# Patient Record
Sex: MALE | URBAN - METROPOLITAN AREA
[De-identification: names, ages, dates, MRNs, and addresses within clinical notes are randomized per-mention and may not be internally consistent; named-entity substitution may affect disease eponyms.]

---

## 2019-10-25 ENCOUNTER — TELEPHONE (OUTPATIENT)
Dept: GASTROENTEROLOGY | Facility: CLINIC | Age: 79
End: 2019-10-25

## 2019-10-25 NOTE — TELEPHONE ENCOUNTER
FAST TRACK (IN MEDIA) - LAST COLONOSCOPY 10/22/2014 - PERSONAL HISTORY POLYPS - SCHEDULE [place].

## 2019-10-25 NOTE — TELEPHONE ENCOUNTER
RECEIVED FAST TRACK FORMS.  HE LISTED [employer] FOR INSURANCE, BUT DID NOT GIVE ID# AND NO COPIES OF INSURANCE CARD.  WILL CALL FOR INFORMATION.

## 2019-10-25 NOTE — TELEPHONE ENCOUNTER
RECEIVED CALL FROM PATIENT.  HAS [employer] CHOICE PPO MEDICARE REPLACEMENT.  ID# [id number] - GROUP# [id number].  UPDATED INFORMATION.

## 2019-10-27 ENCOUNTER — PREP FOR SURGERY (OUTPATIENT)
Dept: OTHER | Facility: HOSPITAL | Age: 79
End: 2019-10-27

## 2019-10-27 DIAGNOSIS — Z86.010 PERSONAL HISTORY OF COLONIC POLYPS: Primary | ICD-10-CM

## 2024-07-09 ENCOUNTER — HOSPITAL ENCOUNTER (EMERGENCY)
Facility: HOSPITAL | Age: 84
Discharge: HOME | End: 2024-07-09
Attending: EMERGENCY MEDICINE
Payer: MEDICARE

## 2024-07-09 ENCOUNTER — APPOINTMENT (OUTPATIENT)
Dept: GENERAL RADIOLOGY | Facility: HOSPITAL | Age: 84
End: 2024-07-09
Payer: MEDICARE

## 2024-07-09 VITALS
TEMPERATURE: 97.6 F | SYSTOLIC BLOOD PRESSURE: 143 MMHG | DIASTOLIC BLOOD PRESSURE: 98 MMHG | BODY MASS INDEX: 23.46 KG/M2 | OXYGEN SATURATION: 97 % | RESPIRATION RATE: 16 BRPM | HEIGHT: 66 IN | HEART RATE: 63 BPM | WEIGHT: 146 LBS

## 2024-07-09 DIAGNOSIS — M16.0 PRIMARY OSTEOARTHRITIS OF BOTH HIPS: ICD-10-CM

## 2024-07-09 DIAGNOSIS — S76.012A HIP STRAIN, LEFT, INITIAL ENCOUNTER: ICD-10-CM

## 2024-07-09 DIAGNOSIS — S73.102A HIP SPRAIN, LEFT, INITIAL ENCOUNTER: Primary | ICD-10-CM

## 2024-07-09 PROCEDURE — 73552 X-RAY EXAM OF FEMUR 2/>: CPT

## 2024-07-09 PROCEDURE — 99283 EMERGENCY DEPT VISIT LOW MDM: CPT

## 2024-07-09 PROCEDURE — 73502 X-RAY EXAM HIP UNI 2-3 VIEWS: CPT

## 2024-07-09 NOTE — ED PROVIDER NOTES
"Subjective   History of Present Illness  83-year-old  male patient presented to the emergency department via private vehicle with a chief complaint of left hip pain.  Patient states he was still in his garden yesterday when he accidentally stepped into a hole.  He states he \"twisted\" his left hip.  He states he has been having left hip discomfort ever since.  He states the pain is located on the lateral side of the hip.  It does not radiate at this time.    History provided by:  Medical records and patient      Review of Systems   Constitutional: Negative.    HENT: Negative.     Respiratory: Negative.     Cardiovascular: Negative.    Gastrointestinal: Negative.    Musculoskeletal:  Positive for arthralgias and myalgias. Negative for back pain, gait problem, joint swelling, neck pain and neck stiffness.   Skin: Negative.    Neurological: Negative.    Hematological: Negative.    Psychiatric/Behavioral: Negative.     All other systems reviewed and are negative.      Past Medical History:   Diagnosis Date    Hypertension     Hypothyroid        Allergies   Allergen Reactions    Keflex [Cephalexin] Anaphylaxis       Past Surgical History:   Procedure Laterality Date    HERNIA REPAIR         History reviewed. No pertinent family history.    Social History     Socioeconomic History    Marital status:    Tobacco Use    Smoking status: Never    Smokeless tobacco: Never   Substance and Sexual Activity    Alcohol use: Not Currently    Drug use: Never           Objective   Physical Exam  Vitals and nursing note reviewed.   Constitutional:       General: He is not in acute distress.     Appearance: He is normal weight. He is not ill-appearing, toxic-appearing or diaphoretic.   HENT:      Head: Normocephalic and atraumatic.      Right Ear: External ear normal.      Left Ear: External ear normal.      Nose: Nose normal.      Mouth/Throat:      Mouth: Mucous membranes are moist.      Pharynx: Oropharynx is clear. "   Eyes:      Extraocular Movements: Extraocular movements intact.      Conjunctiva/sclera: Conjunctivae normal.      Pupils: Pupils are equal, round, and reactive to light.   Cardiovascular:      Rate and Rhythm: Normal rate and regular rhythm.      Heart sounds: Normal heart sounds.   Pulmonary:      Effort: Pulmonary effort is normal.      Breath sounds: Normal breath sounds.   Abdominal:      General: Abdomen is scaphoid. Bowel sounds are normal. There is no distension or abdominal bruit. There are no signs of injury.      Palpations: Abdomen is soft.      Tenderness: There is no abdominal tenderness.   Musculoskeletal:         General: Tenderness and signs of injury present. No deformity.      Cervical back: Normal range of motion and neck supple.      Right hip: Normal.      Left hip: Tenderness and bony tenderness present. No deformity, lacerations or crepitus. Normal range of motion. Normal strength.   Skin:     General: Skin is warm and dry.      Capillary Refill: Capillary refill takes less than 2 seconds.   Neurological:      General: No focal deficit present.      Mental Status: He is alert and oriented to person, place, and time.   Psychiatric:         Mood and Affect: Mood normal.         Behavior: Behavior normal.         Thought Content: Thought content normal.         Judgment: Judgment normal.         Procedures           ED Course                                             Medical Decision Making  Differential diagnosis includes fracture, dislocation, sprain, strain, bursitis, and soft tissue injury.    XR Femur 2 View Left    Result Date: 7/9/2024  Impression: 1. Severe degenerative change of the left hip. No acute bony abnormality. Electronically Signed: Young Edmondson MD  7/9/2024 2:44 PM EDT  Workstation ID: [id number]    XR Hip With or Without Pelvis 2 - 3 View Left    Result Date: 7/9/2024  Impression: 1. Severe degenerative changes of both hips. No acute bony abnormality identified.  Electronically Signed: Young Edmondson MD  7/9/2024 2:43 PM EDT  Workstation ID: [id number]    Discussed imaging and assessment findings.  No acute fractures or dislocations on imaging.  Patient does have bilateral hip osteoarthritis severe in nature.  I do believe patient has a left hip strain/sprain.  Encouraged him to use a cane for support for ambulation.  Patient should follow-up with his primary care if not improving.  He may use Tylenol or Motrin as needed.  Patient understands and agrees to discharge plan.    Problems Addressed:  Hip sprain, left, initial encounter: complicated acute illness or injury  Hip strain, left, initial encounter: complicated acute illness or injury  Primary osteoarthritis of both hips: complicated acute illness or injury    Amount and/or Complexity of Data Reviewed  Radiology: ordered. Decision-making details documented in ED Course.        Final diagnoses:   Hip sprain, left, initial encounter   Hip strain, left, initial encounter   Primary osteoarthritis of both hips       ED Disposition  ED Disposition       ED Disposition   Discharge    Condition   Stable    Comment   --               Jeanmarie Da Silva MD  [address] [facility] [address] [phone]      If symptoms worsen    [facility] EMERGENCY DEPARTMENT  [address] [place] [id number] [phone]    If symptoms worsen         Medication List      No changes were made to your prescriptions during this visit.            Sai Jarrell, APRN  07/09/24 1131

## 2024-07-09 NOTE — DISCHARGE INSTRUCTIONS
Rest and increase fluids.  You may take Tylenol 2 tablets by mouth every 6 hours as needed for pain and inflammation and/or ibuprofen/Motrin/Advil 2 tablets by mouth every 6 hours as needed for pain/inflammation.  Follow-up with your primary care.  Use a cane for support for the next few days.  Follow-up with your primary care if not improving.  Return to the emergency department symptoms get worse or change.